# Patient Record
Sex: FEMALE | Race: BLACK OR AFRICAN AMERICAN
[De-identification: names, ages, dates, MRNs, and addresses within clinical notes are randomized per-mention and may not be internally consistent; named-entity substitution may affect disease eponyms.]

---

## 2020-11-07 ENCOUNTER — HOSPITAL ENCOUNTER (EMERGENCY)
Dept: HOSPITAL 87 - ER | Age: 40
Discharge: HOME | End: 2020-11-07
Payer: MEDICAID

## 2020-11-07 VITALS — WEIGHT: 180.78 LBS | HEIGHT: 70 IN | BODY MASS INDEX: 25.88 KG/M2

## 2020-11-07 VITALS — DIASTOLIC BLOOD PRESSURE: 86 MMHG | SYSTOLIC BLOOD PRESSURE: 137 MMHG

## 2020-11-07 DIAGNOSIS — F41.9: Primary | ICD-10-CM

## 2020-11-07 DIAGNOSIS — Z59.0: ICD-10-CM

## 2020-11-07 DIAGNOSIS — F20.9: ICD-10-CM

## 2020-11-07 PROCEDURE — 99283 EMERGENCY DEPT VISIT LOW MDM: CPT

## 2020-11-07 SDOH — ECONOMIC STABILITY - HOUSING INSECURITY: HOMELESSNESS: Z59.0

## 2021-07-19 RX ORDER — CLONAZEPAM 1 MG
1 TABLET ORAL
Qty: 0 | Refills: 0 | DISCHARGE

## 2021-07-19 RX ORDER — GABAPENTIN 400 MG/1
1 CAPSULE ORAL
Qty: 0 | Refills: 0 | DISCHARGE

## 2021-07-19 RX ORDER — MELOXICAM 15 MG/1
1 TABLET ORAL
Qty: 0 | Refills: 0 | DISCHARGE

## 2021-07-19 RX ORDER — ALBUTEROL 90 UG/1
2 AEROSOL, METERED ORAL
Qty: 0 | Refills: 0 | DISCHARGE

## 2021-07-19 RX ORDER — CHLORHEXIDINE GLUCONATE 213 G/1000ML
1 SOLUTION TOPICAL ONCE
Refills: 0 | Status: DISCONTINUED | OUTPATIENT
Start: 2021-07-21 | End: 2021-08-04

## 2021-07-19 RX ORDER — RISPERIDONE 4 MG/1
10 TABLET ORAL
Qty: 0 | Refills: 0 | DISCHARGE

## 2021-07-19 RX ORDER — HYDROXYZINE HCL 10 MG
1 TABLET ORAL
Qty: 0 | Refills: 0 | DISCHARGE

## 2021-07-19 RX ORDER — HALOPERIDOL DECANOATE 100 MG/ML
150 INJECTION INTRAMUSCULAR
Qty: 0 | Refills: 0 | DISCHARGE

## 2021-07-19 RX ORDER — LIDOCAINE 4 G/100G
1 CREAM TOPICAL
Qty: 0 | Refills: 0 | DISCHARGE

## 2021-07-19 RX ORDER — OMEPRAZOLE 10 MG/1
1 CAPSULE, DELAYED RELEASE ORAL
Qty: 0 | Refills: 0 | DISCHARGE

## 2021-07-19 RX ORDER — BENZTROPINE MESYLATE 1 MG
1 TABLET ORAL
Qty: 0 | Refills: 0 | DISCHARGE

## 2021-07-19 RX ORDER — OXYBUTYNIN CHLORIDE 5 MG
1 TABLET ORAL
Qty: 0 | Refills: 0 | DISCHARGE

## 2021-07-19 NOTE — H&P ADULT - NSICDXFAMILYHX_GEN_ALL_CORE_FT
FAMILY HISTORY:  Mother  Still living? Unknown  FH: myocardial infarction, Age at diagnosis: Age Unknown  FH: stroke, Age at diagnosis: Age Unknown

## 2021-07-19 NOTE — H&P ADULT - NSHPLABSRESULTS_GEN_ALL_CORE
11.0   5.41  )-----------( 301      ( 21 Jul 2021 12:03 )             35.1       140  |  109<H>  |  14  ----------------------------<  106<H>  4.3   |  22  |  0.90    Ca    9.0      21 Jul 2021 12:03    TPro  7.0  /  Alb  3.9  /  TBili  0.3  /  DBili  x   /  AST  18  /  ALT  12  /  AlkPhos  70  07-21      PT/INR - ( 21 Jul 2021 12:03 )   PT: 12.8 sec;   INR: 1.07          PTT - ( 21 Jul 2021 12:03 )  PTT:34.6 sec    CARDIAC MARKERS ( 21 Jul 2021 12:03 )  x     / x     / 276 U/L / x     / 4.5 ng/mL      EKG: NSR w/ no acute ischemic changes noted

## 2021-07-19 NOTE — H&P ADULT - ASSESSMENT
39 y/o woman, current every day smoker, Shellfish Allergy (anaphylaxis), with FHx of CAD/strokes (mother, in her 60s) and PMHx of obesity, HTN, DM2, HLD, anxiety disorder, schizoaffective disorder, obesity, s/p MI (2018), h/o sudden cardiac arrest (2018) asthma, GERD, urinary incontinence, fibromyalgia, LBP, sciatica, and prior cocaine use (quit 2015), presented to her cardiologist Dr. Martinez with c/o exertional, non-radiating, pressure like chest pain x few weeks. CP usually occurs when walking less than 2 blocks or climbing less than 1 flight of stairs, resolves w/ rest, lasts few minutes. As a result, pt underwent a Pharm NST (4/4/21) which showed a small perfusion abnormality of moderate intensity in the anterior region and a small perfusion abnormality of mild intensity in the inferior region on the stress images, which were reversible in the rest images. Post-stress imaging revealed reduced LVEF of 45%. Normal myocardial thickening. Global hypokinesis. In light of pt's risk factors, CCS class III anginal symptoms, and abnormal pharm NST, pt is now referred for cardiac catheterization with possible intervention.     --ASA III; Mallampat III.   --VSS.   --Pt is a candidate for moderate sedation.   --LOAD: pt hills snot take ASa/Plavix at home; Given ASA 325mg PO x1 and Plavix 600mg PO x1.   --FLUIDS: NS 75cc/hr x4hrs.   --SEVERE SHELLFISH ALLERGY: pt given Solucortef 200mg IV x1 and Benadryl 50mg IV x1.     Risks & benefits of procedure and alternative therapy have been explained to the patient including but not limited to: allergic reaction, bleeding w/possible need for blood transfusion, infection, renal and vascular compromise, limb damage, arrhythmia, stroke, vessel dissection/perforation, Myocardial infarction, emergent CABG. Informed consent obtained and in chart.

## 2021-07-19 NOTE — H&P ADULT - NSICDXPASTMEDICALHX_GEN_ALL_CORE_FT
PAST MEDICAL HISTORY:  Anxiety disorder     Asthma     Diabetes mellitus     Fibromyalgia     H/O urinary incontinence     Hyperlipidemia     Hypertension     Lower back pain     Myocardial infarction     Obesity     Schizoaffective disorder     Sciatica     Sudden cardiac arrest

## 2021-07-19 NOTE — H&P ADULT - HISTORY OF PRESENT ILLNESS
COVID   Pharmacy  Escort     41 y/o woman with PMHx of ********** presented to her cardiologist with c/o exertional, non-radiating, pressure like chest pain x few weeks. CP usually occurs within less than 2 blocks or climbing less than 1 flight of stairs, resolves w/ rest, lasts few minutes. As a result, pt underwent ************       In light of pt's risk factors, CCS class III anginal symptoms, and abnormal ********, pt is now referred for cardiac catheterization with possible intervention.  Cardiologist: Dr. Martinez  COVID:  Pharmacy:  Escort:    41 y/o woman, active smoker, with PMHx of obesity, HTN, DM2, HLD, anxiety disorder, schizoaffective disorder, obesity, s/p MI (2018), h/o cardiac arrest (2018) asthma, GERD, urinary incontinence, fibromyalgia, sciatica, and prior cocaine use (quit 2015), presented to her cardiologist with c/o exertional, non-radiating, pressure like chest pain x few weeks. CP usually occurs within less than 2 blocks or climbing less than 1 flight of stairs, resolves w/ rest, lasts few minutes. As a result, pt underwent ************       In light of pt's risk factors, CCS class III anginal symptoms, and abnormal ********, pt is now referred for cardiac catheterization with possible intervention.  Cardiologist: Dr. Martinez  COVID: ******   Pharmacy: *****   Escort: *****     ** Hx obtained per Espinoza VAZQUEZ note **     41 y/o woman, current every day smoker, with FHx of CAD/strokes (mother, in her 60s) and PMHx of obesity, HTN, DM2, HLD, anxiety disorder, schizoaffective disorder, obesity, s/p MI (2018), h/o sudden cardiac arrest (2018) asthma, GERD, urinary incontinence, fibromyalgia, LBP, sciatica, and prior cocaine use (quit 2015), presented to her cardiologist Dr. Martinez with c/o exertional, non-radiating, pressure like chest pain x few weeks. CP usually occurs when walking less than 2 blocks or climbing less than 1 flight of stairs, resolves w/ rest, lasts few minutes. As a result, pt underwent a Pharm NST (4/4/21) which showed a small perfusion abnormality of moderate intensity in the anterior region and a small perfusion abnormality of mild intensity in the inferior region on the stress images, which were reversible in the rest images. Post-stress imaging revealed reduced LVEF of 45%. Normal myocardial thickening. Global hypokinesis.     In light of pt's risk factors, CCS class III anginal symptoms, and abnormal pharm NST, pt is now referred for cardiac catheterization with possible intervention.  Cardiologist: Dr. Martinez  COVID: ******   Pharmacy: *****   Escort: *****     ** Hx obtained per Espinoza VAZQUEZ note **     ** Patient with hx of Shellfish Allergy - Anaphylaxis **     39 y/o woman, current every day smoker, Shellfish Allergy (anaphylaxis), with FHx of CAD/strokes (mother, in her 60s) and PMHx of obesity, HTN, DM2, HLD, anxiety disorder, schizoaffective disorder, obesity, s/p MI (2018), h/o sudden cardiac arrest (2018) asthma, GERD, urinary incontinence, fibromyalgia, LBP, sciatica, and prior cocaine use (quit 2015), presented to her cardiologist Dr. Martinez with c/o exertional, non-radiating, pressure like chest pain x few weeks. CP usually occurs when walking less than 2 blocks or climbing less than 1 flight of stairs, resolves w/ rest, lasts few minutes. As a result, pt underwent a Pharm NST (4/4/21) which showed a small perfusion abnormality of moderate intensity in the anterior region and a small perfusion abnormality of mild intensity in the inferior region on the stress images, which were reversible in the rest images. Post-stress imaging revealed reduced LVEF of 45%. Normal myocardial thickening. Global hypokinesis.     In light of pt's risk factors, CCS class III anginal symptoms, and abnormal pharm NST, pt is now referred for cardiac catheterization with possible intervention.

## 2021-07-21 ENCOUNTER — OUTPATIENT (OUTPATIENT)
Dept: OUTPATIENT SERVICES | Facility: HOSPITAL | Age: 41
LOS: 1 days | Discharge: HOME | End: 2021-07-21
Payer: MEDICAID

## 2021-07-21 DIAGNOSIS — Z98.891 HISTORY OF UTERINE SCAR FROM PREVIOUS SURGERY: Chronic | ICD-10-CM

## 2021-07-21 LAB
A1C WITH ESTIMATED AVERAGE GLUCOSE RESULT: 5.7 % — HIGH (ref 4–5.6)
ALBUMIN SERPL ELPH-MCNC: 3.9 G/DL — SIGNIFICANT CHANGE UP (ref 3.3–5)
ALP SERPL-CCNC: 70 U/L — SIGNIFICANT CHANGE UP (ref 40–120)
ALT FLD-CCNC: 12 U/L — SIGNIFICANT CHANGE UP (ref 10–45)
ANION GAP SERPL CALC-SCNC: 9 MMOL/L — SIGNIFICANT CHANGE UP (ref 5–17)
APTT BLD: 34.6 SEC — SIGNIFICANT CHANGE UP (ref 27.5–35.5)
AST SERPL-CCNC: 18 U/L — SIGNIFICANT CHANGE UP (ref 10–40)
BASOPHILS # BLD AUTO: 0.04 K/UL — SIGNIFICANT CHANGE UP (ref 0–0.2)
BASOPHILS NFR BLD AUTO: 0.7 % — SIGNIFICANT CHANGE UP (ref 0–2)
BILIRUB SERPL-MCNC: 0.3 MG/DL — SIGNIFICANT CHANGE UP (ref 0.2–1.2)
BUN SERPL-MCNC: 14 MG/DL — SIGNIFICANT CHANGE UP (ref 7–23)
CALCIUM SERPL-MCNC: 9 MG/DL — SIGNIFICANT CHANGE UP (ref 8.4–10.5)
CHLORIDE SERPL-SCNC: 109 MMOL/L — HIGH (ref 96–108)
CHOLEST SERPL-MCNC: 124 MG/DL — SIGNIFICANT CHANGE UP
CK MB CFR SERPL CALC: 4.5 NG/ML — SIGNIFICANT CHANGE UP (ref 0–6.7)
CK SERPL-CCNC: 276 U/L — HIGH (ref 25–170)
CO2 SERPL-SCNC: 22 MMOL/L — SIGNIFICANT CHANGE UP (ref 22–31)
CREAT SERPL-MCNC: 0.9 MG/DL — SIGNIFICANT CHANGE UP (ref 0.5–1.3)
EOSINOPHIL # BLD AUTO: 0.21 K/UL — SIGNIFICANT CHANGE UP (ref 0–0.5)
EOSINOPHIL NFR BLD AUTO: 3.9 % — SIGNIFICANT CHANGE UP (ref 0–6)
ESTIMATED AVERAGE GLUCOSE: 117 MG/DL — HIGH (ref 68–114)
GLUCOSE BLDC GLUCOMTR-MCNC: 99 MG/DL — SIGNIFICANT CHANGE UP (ref 70–99)
GLUCOSE SERPL-MCNC: 106 MG/DL — HIGH (ref 70–99)
HCG SERPL-ACNC: <0 MIU/ML — SIGNIFICANT CHANGE UP
HCG SERPL-ACNC: <0 MIU/ML — SIGNIFICANT CHANGE UP
HCT VFR BLD CALC: 35.1 % — SIGNIFICANT CHANGE UP (ref 34.5–45)
HDLC SERPL-MCNC: 40 MG/DL — LOW
HGB BLD-MCNC: 11 G/DL — LOW (ref 11.5–15.5)
IMM GRANULOCYTES NFR BLD AUTO: 0.4 % — SIGNIFICANT CHANGE UP (ref 0–1.5)
INR BLD: 1.07 — SIGNIFICANT CHANGE UP (ref 0.88–1.16)
LIPID PNL WITH DIRECT LDL SERPL: 67 MG/DL — SIGNIFICANT CHANGE UP
LYMPHOCYTES # BLD AUTO: 2.11 K/UL — SIGNIFICANT CHANGE UP (ref 1–3.3)
LYMPHOCYTES # BLD AUTO: 39 % — SIGNIFICANT CHANGE UP (ref 13–44)
MCHC RBC-ENTMCNC: 27 PG — SIGNIFICANT CHANGE UP (ref 27–34)
MCHC RBC-ENTMCNC: 31.3 GM/DL — LOW (ref 32–36)
MCV RBC AUTO: 86 FL — SIGNIFICANT CHANGE UP (ref 80–100)
MONOCYTES # BLD AUTO: 0.34 K/UL — SIGNIFICANT CHANGE UP (ref 0–0.9)
MONOCYTES NFR BLD AUTO: 6.3 % — SIGNIFICANT CHANGE UP (ref 2–14)
NEUTROPHILS # BLD AUTO: 2.69 K/UL — SIGNIFICANT CHANGE UP (ref 1.8–7.4)
NEUTROPHILS NFR BLD AUTO: 49.7 % — SIGNIFICANT CHANGE UP (ref 43–77)
NON HDL CHOLESTEROL: 84 MG/DL — SIGNIFICANT CHANGE UP
NRBC # BLD: 0 /100 WBCS — SIGNIFICANT CHANGE UP (ref 0–0)
PLATELET # BLD AUTO: 301 K/UL — SIGNIFICANT CHANGE UP (ref 150–400)
POTASSIUM SERPL-MCNC: 4.3 MMOL/L — SIGNIFICANT CHANGE UP (ref 3.5–5.3)
POTASSIUM SERPL-SCNC: 4.3 MMOL/L — SIGNIFICANT CHANGE UP (ref 3.5–5.3)
PROT SERPL-MCNC: 7 G/DL — SIGNIFICANT CHANGE UP (ref 6–8.3)
PROTHROM AB SERPL-ACNC: 12.8 SEC — SIGNIFICANT CHANGE UP (ref 10.6–13.6)
RBC # BLD: 4.08 M/UL — SIGNIFICANT CHANGE UP (ref 3.8–5.2)
RBC # FLD: 17.1 % — HIGH (ref 10.3–14.5)
SODIUM SERPL-SCNC: 140 MMOL/L — SIGNIFICANT CHANGE UP (ref 135–145)
TRIGL SERPL-MCNC: 85 MG/DL — SIGNIFICANT CHANGE UP
WBC # BLD: 5.41 K/UL — SIGNIFICANT CHANGE UP (ref 3.8–10.5)
WBC # FLD AUTO: 5.41 K/UL — SIGNIFICANT CHANGE UP (ref 3.8–10.5)

## 2021-07-21 PROCEDURE — 80053 COMPREHEN METABOLIC PANEL: CPT

## 2021-07-21 PROCEDURE — 99152 MOD SED SAME PHYS/QHP 5/>YRS: CPT

## 2021-07-21 PROCEDURE — 85025 COMPLETE CBC W/AUTO DIFF WBC: CPT

## 2021-07-21 PROCEDURE — 93458 L HRT ARTERY/VENTRICLE ANGIO: CPT

## 2021-07-21 PROCEDURE — C1769: CPT

## 2021-07-21 PROCEDURE — 80061 LIPID PANEL: CPT

## 2021-07-21 PROCEDURE — 36415 COLL VENOUS BLD VENIPUNCTURE: CPT

## 2021-07-21 PROCEDURE — 99153 MOD SED SAME PHYS/QHP EA: CPT

## 2021-07-21 PROCEDURE — 83036 HEMOGLOBIN GLYCOSYLATED A1C: CPT

## 2021-07-21 PROCEDURE — 82962 GLUCOSE BLOOD TEST: CPT

## 2021-07-21 PROCEDURE — 82550 ASSAY OF CK (CPK): CPT

## 2021-07-21 PROCEDURE — 82553 CREATINE MB FRACTION: CPT

## 2021-07-21 PROCEDURE — 93010 ELECTROCARDIOGRAM REPORT: CPT

## 2021-07-21 PROCEDURE — 85610 PROTHROMBIN TIME: CPT

## 2021-07-21 PROCEDURE — C1894: CPT

## 2021-07-21 PROCEDURE — C1887: CPT

## 2021-07-21 PROCEDURE — 84702 CHORIONIC GONADOTROPIN TEST: CPT

## 2021-07-21 PROCEDURE — 85730 THROMBOPLASTIN TIME PARTIAL: CPT

## 2021-07-21 PROCEDURE — 93005 ELECTROCARDIOGRAM TRACING: CPT

## 2021-07-21 RX ORDER — DIPHENHYDRAMINE HCL 50 MG
50 CAPSULE ORAL ONCE
Refills: 0 | Status: DISCONTINUED | OUTPATIENT
Start: 2021-07-21 | End: 2021-08-04

## 2021-07-21 RX ORDER — CLOPIDOGREL BISULFATE 75 MG/1
600 TABLET, FILM COATED ORAL ONCE
Refills: 0 | Status: COMPLETED | OUTPATIENT
Start: 2021-07-21 | End: 2021-07-21

## 2021-07-21 RX ORDER — ASPIRIN/CALCIUM CARB/MAGNESIUM 324 MG
325 TABLET ORAL ONCE
Refills: 0 | Status: COMPLETED | OUTPATIENT
Start: 2021-07-21 | End: 2021-07-21

## 2021-07-21 RX ORDER — SODIUM CHLORIDE 9 MG/ML
500 INJECTION INTRAMUSCULAR; INTRAVENOUS; SUBCUTANEOUS
Refills: 0 | Status: DISCONTINUED | OUTPATIENT
Start: 2021-07-21 | End: 2021-08-04

## 2021-07-21 RX ORDER — HYDROCORTISONE 20 MG
200 TABLET ORAL ONCE
Refills: 0 | Status: COMPLETED | OUTPATIENT
Start: 2021-07-21 | End: 2021-07-21

## 2021-07-21 RX ADMIN — Medication 325 MILLIGRAM(S): at 12:56

## 2021-07-21 RX ADMIN — CLOPIDOGREL BISULFATE 600 MILLIGRAM(S): 75 TABLET, FILM COATED ORAL at 12:56

## 2021-07-21 RX ADMIN — Medication 200 MILLIGRAM(S): at 13:44

## 2021-07-21 RX ADMIN — SODIUM CHLORIDE 75 MILLILITER(S): 9 INJECTION INTRAMUSCULAR; INTRAVENOUS; SUBCUTANEOUS at 13:11

## 2021-07-21 NOTE — PROGRESS NOTE ADULT - SUBJECTIVE AND OBJECTIVE BOX
Interventional Cardiology PA SDA Discharge Note    Patient without complaints. Ambulated and voided without difficulties  Afebrile, VSS  Ext: Right Radial :  no  hematoma,   no  bleeding, dressing; C/D/I  Pulses:    intact RAD to baseline     A/P:    39 y/o woman, current every day smoker, Shellfish Allergy (anaphylaxis), with FHx of CAD/strokes (mother, in her 60s) and PMHx of obesity, HTN, DM2, HLD, anxiety disorder, schizoaffective disorder, obesity, s/p MI (2018), h/o sudden cardiac arrest (2018) asthma, GERD, urinary incontinence, fibromyalgia, LBP, sciatica, and prior cocaine use (quit 2015), presented to her cardiologist Dr. Martinez with c/o exertional, non-radiating, pressure like chest pain x few weeks. CP usually occurs when walking less than 2 blocks or climbing less than 1 flight of stairs, resolves w/ rest, lasts few minutes. As a result, pt underwent a Pharm NST (4/4/21) which showed a small perfusion abnormality of moderate intensity in the anterior region and a small perfusion abnormality of mild intensity in the inferior region on the stress images, which were reversible in the rest images. Post-stress imaging revealed reduced LVEF of 45%. Normal myocardial thickening. Global hypokinesis.   In light of pt's risk factors, CCS class III anginal symptoms, and abnormal pharm NST, pt is now referred for cardiac catheterization with possible intervention.     s/p diagnostic cardiac cath (7/21/21): LM normal, dLAD mild diffuse, LCx minor irregularities, RCA minor irregularities; LVEDP 25mmHg, LVEF 55%. R radial TR band.     1.	Stable for discharge as per attending Dr. Rodriguez after bed rest, pt voids, groin/wrist stable and 30 minutes of ambulation.  2.	Follow-up with Cardiologist Dr. Martinez in 1-2 weeks  3.	Discharged forms signed and copies in chart

## 2021-07-23 DIAGNOSIS — I25.119 ATHEROSCLEROTIC HEART DISEASE OF NATIVE CORONARY ARTERY WITH UNSPECIFIED ANGINA PECTORIS: ICD-10-CM

## 2021-07-23 DIAGNOSIS — Z82.49 FAMILY HISTORY OF ISCHEMIC HEART DISEASE AND OTHER DISEASES OF THE CIRCULATORY SYSTEM: ICD-10-CM

## 2021-07-23 DIAGNOSIS — I25.118 ATHEROSCLEROTIC HEART DISEASE OF NATIVE CORONARY ARTERY WITH OTHER FORMS OF ANGINA PECTORIS: ICD-10-CM

## 2021-07-23 DIAGNOSIS — R94.39 ABNORMAL RESULT OF OTHER CARDIOVASCULAR FUNCTION STUDY: ICD-10-CM

## 2021-07-23 DIAGNOSIS — F17.200 NICOTINE DEPENDENCE, UNSPECIFIED, UNCOMPLICATED: ICD-10-CM
